# Patient Record
Sex: FEMALE
[De-identification: names, ages, dates, MRNs, and addresses within clinical notes are randomized per-mention and may not be internally consistent; named-entity substitution may affect disease eponyms.]

---

## 2017-12-22 ENCOUNTER — RESULT REVIEW (OUTPATIENT)
Age: 57
End: 2017-12-22

## 2022-07-30 ENCOUNTER — TELEPHONE ENCOUNTER (OUTPATIENT)
Age: 62
End: 2022-07-30

## 2022-07-31 ENCOUNTER — TELEPHONE ENCOUNTER (OUTPATIENT)
Age: 62
End: 2022-07-31

## 2022-08-17 ENCOUNTER — TELEPHONE ENCOUNTER (OUTPATIENT)
Dept: URBAN - METROPOLITAN AREA CLINIC 7 | Facility: CLINIC | Age: 62
End: 2022-08-17

## 2022-08-19 ENCOUNTER — DASHBOARD ENCOUNTERS (OUTPATIENT)
Age: 62
End: 2022-08-19

## 2022-08-19 ENCOUNTER — TELEPHONE ENCOUNTER (OUTPATIENT)
Dept: URBAN - METROPOLITAN AREA CLINIC 7 | Facility: CLINIC | Age: 62
End: 2022-08-19

## 2022-08-19 VITALS — WEIGHT: 125 LBS | HEIGHT: 67 IN | BODY MASS INDEX: 19.62 KG/M2

## 2022-09-26 ENCOUNTER — CLAIMS CREATED FROM THE CLAIM WINDOW (OUTPATIENT)
Dept: URBAN - METROPOLITAN AREA SURGERY CENTER 9 | Facility: SURGERY CENTER | Age: 62
End: 2022-09-26
Payer: COMMERCIAL

## 2022-09-26 DIAGNOSIS — Z12.11 COLON CANCER SCREENING: ICD-10-CM

## 2022-09-26 DIAGNOSIS — K63.5 BENIGN COLON POLYP: ICD-10-CM

## 2022-09-26 DIAGNOSIS — K64.8 BLEEDING INTERNAL HEMORRHOIDS: ICD-10-CM

## 2022-09-26 PROCEDURE — 45381 COLONOSCOPY SUBMUCOUS NJX: CPT | Performed by: INTERNAL MEDICINE

## 2022-09-26 PROCEDURE — 45385 COLONOSCOPY W/LESION REMOVAL: CPT | Performed by: INTERNAL MEDICINE

## 2023-07-06 ENCOUNTER — TELEPHONE ENCOUNTER (OUTPATIENT)
Dept: URBAN - METROPOLITAN AREA CLINIC 7 | Facility: CLINIC | Age: 63
End: 2023-07-06

## 2024-07-16 ENCOUNTER — OFFICE VISIT (OUTPATIENT)
Dept: URBAN - METROPOLITAN AREA CLINIC 9 | Facility: CLINIC | Age: 64
End: 2024-07-16
Payer: MEDICARE

## 2024-07-16 VITALS
WEIGHT: 130 LBS | SYSTOLIC BLOOD PRESSURE: 110 MMHG | BODY MASS INDEX: 20.4 KG/M2 | HEIGHT: 67 IN | DIASTOLIC BLOOD PRESSURE: 70 MMHG

## 2024-07-16 DIAGNOSIS — K59.09 CHRONIC CONSTIPATION: ICD-10-CM

## 2024-07-16 DIAGNOSIS — Z86.010 HISTORY OF ADENOMATOUS POLYP OF COLON: ICD-10-CM

## 2024-07-16 DIAGNOSIS — K64.8 INTERNAL HEMORRHOIDS: ICD-10-CM

## 2024-07-16 PROBLEM — 236069009: Status: ACTIVE | Noted: 2024-07-16

## 2024-07-16 PROCEDURE — 99214 OFFICE O/P EST MOD 30 MIN: CPT | Performed by: INTERNAL MEDICINE

## 2024-07-16 NOTE — HPI-TODAY'S VISIT:
62yo F with will go 2 weeks without bm and then wehn she does go will have brsistool stool type 1.  This will also occur with straining followed by rectal bleeding that occurs a couple days per month. sometimes her stool can be dark and tar like but sometimes fresh blood.  she has tried to increase her fiber which has helped.  she tried metmucil but had trouble drinking it.  she has used MoM as well which has helped.  she has been dealing with this her entire adult life.  last colonscopy with Dr. Samano in 2022 unremarkable other than 2 adenomas told to repat in 2025.  no fmh of crc.

## 2025-07-17 ENCOUNTER — TELEPHONE ENCOUNTER (OUTPATIENT)
Dept: URBAN - METROPOLITAN AREA CLINIC 6 | Facility: CLINIC | Age: 65
End: 2025-07-17

## 2025-07-30 ENCOUNTER — TELEPHONE ENCOUNTER (OUTPATIENT)
Dept: URBAN - METROPOLITAN AREA CLINIC 7 | Facility: CLINIC | Age: 65
End: 2025-07-30

## 2025-07-30 RX ORDER — SODIUM, POTASSIUM,MAG SULFATES 17.5-3.13G
AS DIRECTED SOLUTION, RECONSTITUTED, ORAL ORAL
Qty: 1 | Refills: 0 | OUTPATIENT
Start: 2025-07-30 | End: 2025-08-01

## 2025-07-31 ENCOUNTER — OFFICE VISIT (OUTPATIENT)
Dept: URBAN - METROPOLITAN AREA CLINIC 9 | Facility: CLINIC | Age: 65
End: 2025-07-31

## 2025-08-05 ENCOUNTER — CLAIMS CREATED FROM THE CLAIM WINDOW (OUTPATIENT)
Dept: URBAN - METROPOLITAN AREA SURGERY CENTER 9 | Facility: SURGERY CENTER | Age: 65
End: 2025-08-05
Payer: MEDICARE

## 2025-08-05 DIAGNOSIS — Z86.0100 PERSONAL HISTORY OF COLON POLYPS, UNSPECIFIED: ICD-10-CM

## 2025-08-05 DIAGNOSIS — K57.30 DIVERTICULOSIS OF LARGE INTESTINE WITHOUT PERFORATION OR ABSCESS WITHOUT BLEEDING: ICD-10-CM

## 2025-08-05 DIAGNOSIS — Z09 ENCOUNTER FOR COLONOSCOPY FOLLOWING COLON POLYP REMOVAL: ICD-10-CM

## 2025-08-05 DIAGNOSIS — K64.8 OTHER HEMORRHOIDS: ICD-10-CM

## 2025-08-05 PROCEDURE — G0105 COLORECTAL SCRN; HI RISK IND: HCPCS | Performed by: INTERNAL MEDICINE

## 2025-08-05 PROCEDURE — 0529F INTRVL 3/>YR PTS CLNSCP DOCD: CPT | Performed by: CLINIC/CENTER

## 2025-08-05 PROCEDURE — G0105 COLORECTAL SCRN; HI RISK IND: HCPCS | Performed by: CLINIC/CENTER

## 2025-08-05 PROCEDURE — 0529F INTRVL 3/>YR PTS CLNSCP DOCD: CPT | Performed by: INTERNAL MEDICINE

## 2025-08-05 PROCEDURE — 00811 ANES LWR INTST NDSC NOS: CPT | Performed by: NURSE ANESTHETIST, CERTIFIED REGISTERED
